# Patient Record
Sex: FEMALE | Race: WHITE | NOT HISPANIC OR LATINO | Employment: OTHER | URBAN - METROPOLITAN AREA
[De-identification: names, ages, dates, MRNs, and addresses within clinical notes are randomized per-mention and may not be internally consistent; named-entity substitution may affect disease eponyms.]

---

## 2017-09-18 ENCOUNTER — ALLSCRIPTS OFFICE VISIT (OUTPATIENT)
Dept: OTHER | Facility: OTHER | Age: 77
End: 2017-09-18

## 2017-10-26 NOTE — PROGRESS NOTES
Assessment  Assessed    1  Benign essential hypertension (401 1) (I10)   2  Chronic coronary artery disease (414 00) (I25 10)   3  Hypercholesterolemia (272 0) (E78 00)    Plan  Benign essential hypertension    · Atenolol 50 MG Oral Tablet (Tenormin); TAKE 1 TABLET DAILY   Rx By: Deena Arauz; Dispense: 90 Days ; #:90 Tablet; Refill: 3;For: Benign essential hypertension; NITHYA = N; Verified Transmission to [address]; Last Updated By: System, SureScripts; 9/18/2017 2:59:10 PM  Chronic coronary artery disease    · Follow-up visit in 1 year Evaluation and Treatment  Follow-up  Status: Hold For -  Scheduling  Requested for: 68Etz6085   Ordered; For: Chronic coronary artery disease; Ordered By: Deena Arauz Performed:  Due: 67Yii0554  Edema    · Furosemide 20 MG Oral Tablet (Lasix); TAKE 1 TABLET DAILY   Rx By: Deena Arauz; Dispense: 90 Days ; #:90 Tablet; Refill: 3;For: Edema; NITHYA = N; Verified Transmission to [facility]; Last Updated By: System, SureScripts; 9/18/2017 2:59:09 PM  Hypercholesterolemia    · EKG/ECG- POC; Status:Complete;   Done: 64HDC5031 02:55PM   Perform: In Office; Last Updated Jaime Crawford; 9/18/2017 2:55:45 PM;Ordered;For:Hypercholesterolemia; Ordered By:Gunner Broussard;    Discussion/Summary  Cardiology Discussion Summary Free Text Note Form [facility]:   1  CAD  Nonobstructive by cath in 2009, with 50% stenosis in proximal RCA that was not significant by FFR at that time  She is on Atenolol, aspirin, and Simvastatin  HR is slightly irregular, but on EKG is sinus with sinus arrhythmia  On  instead of 81 mg due to tamoxifen use  HTN  On Atenolol and Quinapril and Lasix as needed (takes once every few weeks)  BP borderline low, but she denies any symptoms  If she does become lightheaded or dizzy, consider decreasing Quinapril  HL  On Simvastatin 20 and Zetia  Lipid panel 9/15 showed total cholesterol 130, , HDL 39, LDL 71   Lipid panel 3/17 showed total cholesterol 142, HDL 42, LDL 77,   Chief Complaint  Chief Complaint Free Text Note Form: 10 month cardiac follow up  No cardiac complaints  History of Present Illness  Cardiology HPI Free Text Note Form [facility]: 65year old woman with a history of CAD, HTN, HL, who previously followed with Dr Barrie Rubio, now following with me in [facility] office  underwent cardiac catheterization in 1/09 for abnormal pharmacologic stress test showing possible distal anterior ischemia  At that time, cath showed a 50% proximal stenosis in RCA, with FFR being 0 85 and not considered significant  12/06 showed normal LV and RV size and function, top normal LV wall thickness with grade I diastolic dysfunction  Mild MAC with mild MR  Aortic sclerosis  Mild TR with top normal RVSP (37 mm Hg)  Redundant atrial septal motion without evidence of intra-atrial shunt   had an episode of syncope in 3/16, thought it was related to Tamoxifen and dehydration  She has not had further syncope  denies any chest pain, shortness of breath  No LE edema  She takes Lasix once every few weeks for ankle edema  No orthopnea, uses 1 pillow to sleep, no PND  No palpitations, no dizziness or lightheadedness  She has vertigo, occurs with position  She walks over a mile a day a day, no chest pain or shortness of breath with this type of exertion  She reports she has limited her ice cream intake and has lost some weight  Review of Systems  Cardiology Female ROS:     Cardiac: no chest pain,-- no rhythm problems,-- no fainting/blackouts,-- no heart murmur present,-- no signs of swelling-- and-- no palpitations present  Skin: No complaints of nonhealing sores or skin rash     Genitourinary: post menopausal, but-- no recurrent urinary tract infections,-- no frequent urination at night,-- no difficult urination,-- no blood in urine,-- no kidney stones,-- no loss of bladder control,-- no kidney problems,-- no birth control or hormone replacement therapy-- and-- not pregnant   Psychological: No complaints of feeling depressed, anxiety, panic attacks, or difficulty concentrating  General: lack of energy/fatigue, but-- no trouble sleeping,-- no appetite changes,-- no changes in weight,-- no fever,-- no night sweats-- and-- no frequent infections  Respiratory: No complaints of shortness of breath, cough with sputum, or wheezing  HEENT: serious eye problems, but-- no hearing problems,-- no nose problems,-- no throat problems-- and-- no snoring   Gastrointestinal: No complaints of liver problems, nausea, vomiting, heartburn, constipation, bloody stools, diarrhea, problems swallowing, adbominal pain, or rectal bleeding  Hematologic: No complaints of bleeding disorders, anemia, blood clots, or excessive brusing  Neurological: No complaints of numbness, tingling, dizziness, weakness, seizures, headaches, syncope or fainting, AM fatigue, daytime sleepiness, no witnessed apnea episodes  Musculoskeletal: arthritis, but-- no back pain-- and-- no swelling/pain-- knees  ROS Reviewed:   ROS reviewed  Active Problems  Problems    1  Benign essential hypertension (401 1) (I10)   2  Chronic coronary artery disease (414 00) (I25 10)   3  Edema (782 3) (R60 9)   4  Hypercholesterolemia (272 0) (E78 00)   5  Hypokalemia (276 8) (E87 6)   6  Syncope (780 2) (R55)   7  Syncope, unspecified syncope type (780 2) (R55)    Past Medical History  Problems    1  History of carotid artery stenosis (V12 59) (Z86 79)   2  History of Prolapsing Mitral Valve Leaflet Syndrome (424 0)  Active Problems And Past Medical History Reviewed: The active problems and past medical history were reviewed and updated today  Surgical History  Problems    1  History of Breast Surgery Mastectomy   2  History of Cataract Surgery  Surgical History Reviewed: The surgical history was reviewed and updated today  Family History  Family History Reviewed:    The family history was reviewed and updated today  Social History  Problems    · Former smoker (M83 55) (I79 321)  Social History Reviewed: The social history was reviewed and updated today  Current Meds   1  Aspirin 325 MG CAPS; Therapy: (Recorded:28Nov2016) to Recorded   2  Atenolol 50 MG Oral Tablet; Take 1 tablet daily; Therapy: 84QAL6656 to (Evaluate:96Ovi5377)  Requested for: 37Sdy5574; Last   Rx:85Vlo2542 Ordered   3  CVS L-Lysine 500 MG Oral Tablet; Take as directed Recorded   4  Desonide 0 05 % External Ointment; Therapy: (Recorded:10Mar2015) to Recorded   5  Ezetimibe 10 MG Oral Tablet; TAKE 1 TABLET DAILY ALONG WITH SIMVASTATIN   REPLACES VYTORIN**;   Therapy: 07OOQ7944 to (Evaluate:27Jan2018)  Requested for: 16NSY9195; Last   Rx:11Wmp6664 Ordered   6  Furosemide 20 MG Oral Tablet; TAKE 1 TABLET DAILY; Therapy: 87FDF3962 to (Evaluate:07Jun2015)  Requested for: 12Jun2014; Last   Rx:12Jun2014 Ordered   7  HM Vitamin D3 2000 UNIT Oral Capsule; TAKE 1 CAPSULE Daily Recorded   8  Macular Vitamin Benefit Oral Tablet; Take 1 tablet daily Recorded   9  Omega Essentials/Vit D3 CAPS; Take 1 capsule twice daily Recorded   10  Potassium Chloride ER 10 MEQ Oral Tablet Extended Release; TAKE 1 TABLET TWICE    DAILY WITH MEALS; Therapy: 07YZZ9862 to (Evaluate:09Oct2017)  Requested for: 59Rap1293; Last    Rx:11Afo4927 Ordered   11  Quinapril HCl - 10 MG Oral Tablet; take 1 tablet by mouth daily; Therapy: 36LSE5297 to (Gomez Reilly)  Requested for: 12Jun2017; Last    Rx:08Jun2017 Ordered   12  Simvastatin 20 MG Oral Tablet; Take 1 tablet by mouth  daily; Therapy: 51WDO6829 to (21 )  Requested for: 84DVH0995; Last    Rx:92Eis1471 Ordered   13  Vitamin B-12 1000 MCG Oral Tablet; Therapy: 56AST6603 to Recorded   14   Zetia 10 MG Oral Tablet; TAKE 1 TABLET DAILY ALONG WITH SIMVASTATIN REPLACES    VYTORIN**;    Therapy: 40PFR2763 to (Evaluate:01Rkf7639)  Requested for: 25Oct2016; Last    Rx:25Oct2016; Status: ACTIVE - Retrospective Authorization Ordered  Medication List Reviewed: The medication list was reviewed and updated today  Allergies  Medication    1  Sulfa Drugs   2  Adhesive Tape TAPE   3  Clarithromycin TABS   4  Doxycycline Monohydrate CAPS   5  Erythromycin Derivatives   6  Penicillins  Non-Medication    7  IVP Dye    Vitals  Vital Signs    Recorded: 57Ndk6375 02:53PM   Heart Rate 67, Apical   Pulse Quality Normal, Apical   Systolic 96, LUE, Sitting   Diastolic 68, LUE, Sitting   Height 5 ft 5 5 in   Weight 145 lb    BMI Calculated 23 76   BSA Calculated 1 74   O2 Saturation 98, RA     Physical Exam    Constitutional   General appearance: No acute distress, well appearing and well nourished  Eyes   Conjunctiva and Sclera examination: Conjunctiva pink, sclera anicteric  Ears, Nose, Mouth, and Throat - Oropharynx: Clear, nares are clear, mucous membranes are moist    Neck   Neck and thyroid: Normal, supple, trachea midline, no thyromegaly  Pulmonary   Respiratory effort: No increased work of breathing or signs of respiratory distress  Auscultation of lungs: Clear to auscultation, no rales, no rhonchi, no wheezing, good air movement  Cardiovascular   Palpation of heart: Normal PMI, no thrills  Auscultation of heart: Normal rate and rhythm, normal S1 and S2, no murmurs  Carotid pulses: Normal, 2+ bilaterally  Examination of extremities for edema and/or varicosities: Normal     Chest - Chest: Normal    Abdomen   Abdomen: Non-tender and no distention  Musculoskeletal Gait and station: Normal gait  Skin - Skin and subcutaneous tissue: Normal without rashes or lesions  Skin is warm and well perfused, normal turgor  Neurologic - Speech: Normal     Psychiatric - Orientation to person, place, and time: Normal -- Mood and affect: Normal       Results/Data  ECG Report:   Rhythm and rate:  normal sinus rhythm     Axis: the QRS axis is normal       Signatures   Electronically signed by Caroline Crain MD; Sep 18 2017  3:07PM EST                       (Author)

## 2018-01-12 VITALS
SYSTOLIC BLOOD PRESSURE: 96 MMHG | WEIGHT: 145 LBS | OXYGEN SATURATION: 98 % | HEART RATE: 67 BPM | BODY MASS INDEX: 23.3 KG/M2 | DIASTOLIC BLOOD PRESSURE: 68 MMHG | HEIGHT: 66 IN

## 2018-01-13 NOTE — MISCELLANEOUS
Message   Recorded as Task   Date: 04/01/2016 08:12 AM, Created By: Sergei Stanton   Task Name: Medical Complaint Callback   Assigned To: Sergei Stanton   Regarding Patient: Moshe Pantoja, Status: Active   Comment:    Sergei Stanton - 01 Apr 2016 8:12 AM     TASK CREATED  Caller: Self; Medical Complaint; [phone] (Home)  pt called to report that she fainted last Thursday 3/24/she questioned if due to gereric atenolol   told her probably not   said fainted without warning did hit her head   did not get checked out anywhere  States BP was good and pulse rate was good when she came to  She felt may be do to her tamoxifen seeing Oncology early next week please advise  Ankur Farah - 01 Apr 2016 8:41 AM     TASK REPLIED TO: Previously Assigned To Ankur Farah  Nothing at present  Report episode to oncology, but call back  Obviously, if any further syncope or near syncope  Prior to that visit  Agreed not due to medication   Dana Alvarez - 01 Apr 2016 9:35 AM     TASK EDITED  Pt called and understands to discuss with oncology too and if any new episodes prior to future appt here to call  pt has had no farther symptoms  Active Problems    1  Accelerated essential hypertension (401 0) (I10)   2  Chronic coronary artery disease (414 00) (I25 10)   3  Edema (782 3) (R60 9)   4  Hypercholesterolemia (272 0) (E78 0)   5  Hypokalemia (276 8) (E87 6)    Current Meds   1  Aspirin EC 81 MG Oral Tablet Delayed Release; Take 1 tablet daily Recorded   2  Atenolol 100 MG Oral Tablet (Tenormin); Take 1 tablet daily; Therapy: 61ZSV6489 to (Celeste Isaac)  Requested for: 97QAR4339; Last   Rx:10Mar2015 Ordered   3  CVS L-Lysine 500 MG Oral Tablet; Take as directed Recorded   4  Desonide 0 05 % External Ointment; Therapy: (Recorded:10Mar2015) to Recorded   5  Furosemide 20 MG Oral Tablet (Lasix); TAKE 1 TABLET DAILY; Therapy: 49ZHQ1550 to (Evaluate:07Jun2015)  Requested for: 12Jun2014;  Last Rx: 36GCU9939 Ordered   6  HM Vitamin D3 2000 UNIT Oral Capsule; TAKE 1 CAPSULE Daily Recorded   7  Lasix 20 MG Oral Tablet (Furosemide); Take 1 tablet once daily; Therapy: 89BGP8127 to (Evaluate:27Jun2016)  Requested for: 72LXO8106; Last   Rx:30Nov2015 Ordered   8  Macular Vitamin Benefit Oral Tablet; Take 1 tablet daily Recorded   9  Omega Essentials/Vit D3 CAPS; Take 1 capsule twice daily Recorded   10  Potassium Chloride ER 10 MEQ Oral Tablet Extended Release; TAKE 1 TABLET TWICE    DAILY WITH MEALS; Therapy: 07FBD9031 to (Margaret Mclain)  Requested for: 30PVB1691; Last    Rx:26Oct2015 Ordered   11  Quinapril HCl - 10 MG Oral Tablet; TAKE 1 TABLET BY MOUTH DAILY; Therapy: 25HWH5390 to (August Mis)  Requested for: 27Edl1053; Last    Rx:76Egt7148 Ordered   12  Quinapril HCl - 10 MG Oral Tablet; TAKE 1 TABLET DAILY; Therapy: 13UMV4026 to (Evaluate:12Jun2015)  Requested for: 61KWL3088; Last    Rx:64Ntb7424 Ordered   13  Simvastatin 20 MG Oral Tablet; TAKE 1 TABLET DAILY ALONG WITH ZETIA **    REPLACES VYTORIN**;    Therapy: 29IIO9221 to (Evaluate:12Oct2016)  Requested for: 38QQJ5870; Last    Rx:16Mar2016 Ordered   14  Tamoxifen Citrate 20 MG Oral Tablet; Therapy: 78FKV7372 to Recorded   15  Tenormin 100 MG Oral Tablet (Atenolol); TAKE 1TABLET DAILY AS DIRECTED; Therapy: 14BQX6348 to (Evaluate:90Dfs4494)  Requested for: 99Tzp3971; Last    Rx:96Kut2925 Ordered   16  Zetia 10 MG Oral Tablet; TAKE 1 TABLET DAILY ALONG WITH SIMVASTATIN    REPLACES VYTORIN**;    Therapy: 11TTM6795 to (Evaluate:12Oct2016)  Requested for: 19PMJ1264; Last    Rx:16Mar2016 Ordered    Allergies    1  Sulfa Drugs   2  Adhesive Tape TAPE   3  Clarithromycin TABS   4  Doxycycline Monohydrate CAPS   5  Erythromycin Derivatives   6  Penicillins    7   IVP Dye    Signatures   Electronically signed by : Jany Crocker, ; Apr 1 2016  9:36AM EST                       (Author)

## 2018-03-21 DIAGNOSIS — I10 ESSENTIAL HYPERTENSION: Primary | ICD-10-CM

## 2018-03-22 RX ORDER — QUINAPRIL 10 MG/1
TABLET ORAL
Qty: 30 TABLET | Refills: 5 | Status: SHIPPED | OUTPATIENT
Start: 2018-03-22 | End: 2018-10-03 | Stop reason: SDUPTHER

## 2018-04-11 DIAGNOSIS — E78.5 DYSLIPIDEMIA, GOAL LDL BELOW 70: Primary | ICD-10-CM

## 2018-04-11 RX ORDER — SIMVASTATIN 20 MG
TABLET ORAL
Qty: 30 TABLET | Refills: 11 | Status: SHIPPED | OUTPATIENT
Start: 2018-04-11 | End: 2018-08-31

## 2018-07-30 RX ORDER — DESONIDE 0.5 MG/G
CREAM TOPICAL
COMMUNITY
End: 2018-08-31

## 2018-07-30 RX ORDER — ATENOLOL 50 MG/1
1 TABLET ORAL DAILY
COMMUNITY
Start: 2012-01-11 | End: 2018-08-31 | Stop reason: SDUPTHER

## 2018-07-30 RX ORDER — POTASSIUM CHLORIDE 750 MG/1
1 CAPSULE, EXTENDED RELEASE ORAL 2 TIMES DAILY
COMMUNITY
Start: 2012-01-11 | End: 2018-08-31

## 2018-07-30 RX ORDER — EZETIMIBE 10 MG/1
TABLET ORAL
COMMUNITY
Start: 2017-07-31 | End: 2018-08-31

## 2018-07-30 RX ORDER — BUTALBITAL, ASPIRIN, AND CAFFEINE 50; 325; 40 MG/1; MG/1; MG/1
CAPSULE ORAL
COMMUNITY
End: 2018-08-31

## 2018-07-30 RX ORDER — LANOLIN ALCOHOL/MO/W.PET/CERES
CREAM (GRAM) TOPICAL
COMMUNITY
Start: 2016-05-12 | End: 2018-08-31

## 2018-07-30 RX ORDER — FUROSEMIDE 20 MG/1
1 TABLET ORAL DAILY
COMMUNITY
Start: 2014-06-12 | End: 2018-08-31

## 2018-07-30 RX ORDER — LANOLIN ALCOHOL/MO/W.PET/CERES
CREAM (GRAM) TOPICAL
COMMUNITY

## 2018-07-30 RX ORDER — SIMVASTATIN 20 MG
TABLET ORAL
COMMUNITY
Start: 2016-03-16 | End: 2018-08-31

## 2018-08-31 ENCOUNTER — OFFICE VISIT (OUTPATIENT)
Dept: CARDIOLOGY CLINIC | Facility: CLINIC | Age: 78
End: 2018-08-31
Payer: MEDICARE

## 2018-08-31 VITALS
HEIGHT: 66 IN | HEART RATE: 71 BPM | WEIGHT: 147 LBS | SYSTOLIC BLOOD PRESSURE: 124 MMHG | DIASTOLIC BLOOD PRESSURE: 86 MMHG | BODY MASS INDEX: 23.63 KG/M2 | OXYGEN SATURATION: 98 %

## 2018-08-31 DIAGNOSIS — I25.10 CORONARY ARTERY DISEASE INVOLVING NATIVE CORONARY ARTERY OF NATIVE HEART WITHOUT ANGINA PECTORIS: ICD-10-CM

## 2018-08-31 DIAGNOSIS — I10 ESSENTIAL HYPERTENSION: Primary | ICD-10-CM

## 2018-08-31 DIAGNOSIS — E78.2 MIXED HYPERLIPIDEMIA: ICD-10-CM

## 2018-08-31 PROCEDURE — 99214 OFFICE O/P EST MOD 30 MIN: CPT | Performed by: INTERNAL MEDICINE

## 2018-08-31 RX ORDER — ATORVASTATIN CALCIUM 40 MG/1
40 TABLET, FILM COATED ORAL DAILY
Qty: 30 TABLET | Refills: 5 | Status: SHIPPED | OUTPATIENT
Start: 2018-08-31 | End: 2019-02-28 | Stop reason: SDUPTHER

## 2018-08-31 RX ORDER — ASPIRIN 325 MG
325 TABLET ORAL DAILY
COMMUNITY

## 2018-08-31 RX ORDER — ATENOLOL 50 MG/1
50 TABLET ORAL DAILY
Qty: 30 TABLET | Refills: 11 | Status: SHIPPED | OUTPATIENT
Start: 2018-08-31 | End: 2019-09-27 | Stop reason: SDUPTHER

## 2018-08-31 RX ORDER — TAMOXIFEN CITRATE 20 MG/1
20 TABLET ORAL 2 TIMES DAILY
COMMUNITY

## 2018-08-31 NOTE — PROGRESS NOTES
CARDIOLOGY OFFICE VISIT  [facility]  [address], [place], [address] [place] [address], [place], [facility] Sunil Abarca  Tel: [phone]      NAME: Lilibeth Mahoney  AGE: 77 y o  SEX: female  : 1940   MRN: [id number]      Chief Complaint:  Chief Complaint   Patient presents with    Follow-up     HTN, CAD  History of Present Illness:   Patient of Dr Gustavo Barton who wants to change over to our office  She comes for a follow up  States she is doing well from cardiac stand point and denies chest pain / pressure, SOB, palpitations, lightheadedness, syncope, swelling feet, orthopnea, PND, claudication  She walks over a mile a day a day, no chest pain or shortness of breath felt  CAD -   Cardiac catheterization in 2009 showed 50% stenosis in proximal RCA that was not significant by FFR at that time  Currently on aspirin 325 mg (instead of 81 mg due to tamoxifen use), atenolol, simvastatin  She was on Zetia but she has not been able to afford it recently  States is doing well cardiac wise with no cardiac symptoms  HTN -  Has been hypertensive for many years  Taking medications (Atenolol, Quinapril) regularly  Denies lightheadedness, headache, medication side effects  HLP -  Has had hyperlipidemia for many years  Taking simvastatin 20 mg but not taking Zetia recently due to cost  Denies myalgia  Due for lipid panel via PCP    She had an episode of syncope in 3/16, thought it was related to Tamoxifen and dehydration  She has not had further syncope  Echo  showed normal LV and RV size and function, top normal LV wall thickness with grade I diastolic dysfunction  Mild MAC with mild MR  Aortic sclerosis  Mild TR with top normal RVSP (37 mm Hg)  Redundant atrial septal motion without evidence of intra-atrial shunt        Past Medical History:  Past Medical History:   Diagnosis Date    Coronary artery disease     Coronary artery stenosis     Edema     Hypercholesterolemia     Hypertension     Hypokalemia     Mitral valve prolapse     Syncope          Past Surgical History:  Past Surgical History:   Procedure Laterality Date    CATARACT EXTRACTION      MASTECTOMY           Family History:   noncontributory      Social History:  Social History     Social History    Marital status: /Civil Union     Spouse name: N/A    Number of children: N/A    Years of education: N/A     Social History Main Topics    Smoking status: Former Smoker    Smokeless tobacco: Never Used    Alcohol use Not on file    Drug use: Unknown    Sexual activity: Not on file     Other Topics Concern    Not on file     Social History Narrative    No narrative on file         Active Problems:  Patient Active Problem List   Diagnosis    Coronary artery disease involving native coronary artery of native heart without angina pectoris    Essential hypertension    Mixed hyperlipidemia         The following portions of the patient's history were reviewed and updated as appropriate: past medical history, past surgical history, past family history,  past social history, current medications, allergies and problem list       Review of Systems:  Constitutional: Denies fever, chills, fatigue  Eyes: Denies eye redness, eye discharge, double vision  ENT: Denies hearing loss, tinnitus, sneezing, nasal discharge, sore throat   Respiratory: Denies cough, expectoration, hemoptysis, shortness of breath  Cardiovascular: Denies chest pain, palpitations, orthopnea, PND, lower extremity swelling  Gastrointestinal: Denies abdominal pain, nausea, vomiting, hematemesis, diarrhea, bloody stools  Genito-Urinary: Denies dysuria, incontinence  Musculoskeletal: Denies back pain, joint pain, muscle pain  Neurologic: Denies confusion, lightheadedness, syncope, headache, focal weakness, sensory changes, seizures  Endocrine: Denies polyuria, polydipsia, temperature intolerance  Allergy and Immunology: Denies hives, insect bite sensitivity  Hematological and Lymphatic: Denies bleeding problems, swollen glands   Psychological: Denies depression, suicidal ideation, anxiety, panic  Dermatological: Denies pruritus, rash, skin lesion changes      Vitals:  Vitals:    08/31/18 1357   BP: 124/86   Pulse: 71   SpO2: 98%       Body mass index is 24 09 kg/m²  Weight (last 2 days)     Date/Time   Weight    08/31/18 1357  66 7 (147)                Physical Examination:  General: Patient is not in acute distress  Awake, alert, oriented in time, place and person  Responding to commands  Head: Normocephalic  Atraumatic  Eyes: Both pupils normal sized, round and reactive to light  Nonicteric  ENT: Normal external ear canals  Nares normal, no drainage  Lips and oral mucosa normal  Neck: Supple  JVP not raised  Trachea central  No thyromegaly  Lungs: Bilateral bronchovascular breath sounds with no crackles or rhonchi  Chest wall: No tenderness  Cardiovascular: RRR  S1 and S2 normal  No murmur, rub or gallop  Gastrointestinal: Abdomen soft, nontender  No guarding or rigidity  Liver and spleen not palpable  Bowel sounds present  Neurologic: Patient is awake, alert, oriented in time, place and person  Responding to command  Moving all extremities  Integumentary:  No skin rash  Lymphatic: No cervical lymphadenopathy  Back: Symmetric   No CVA tenderness  Extremities: No clubbing, cyanosis or edema      Medications:    Current Outpatient Prescriptions:     aspirin 325 mg tablet, Take 325 mg by mouth daily, Disp: , Rfl:     atenolol (TENORMIN) 50 mg tablet, Take 1 tablet (50 mg total) by mouth daily, Disp: 30 tablet, Rfl: 11    Cholecalciferol (HM VITAMIN D3) 2000 units CAPS, Take 1 capsule by mouth daily, Disp: , Rfl:     Lysine HCl (CVS L-LYSINE) 500 MG TABS, Take by mouth, Disp: , Rfl:     quinapril (ACCUPRIL) 10 mg tablet, TAKE 1 TABLET BY MOUTH DAILY, Disp: 30 tablet, Rfl: 5    tamoxifen (NOLVADEX) 20 mg tablet, Take 20 mg by mouth 2 (two) times a day, Disp: , Rfl:     atorvastatin (LIPITOR) 40 mg tablet, Take 1 tablet (40 mg total) by mouth daily, Disp: 30 tablet, Rfl: 5      Allergies: Allergies   Allergen Reactions    Clarithromycin      Reaction Date: [id number];     Doxycycline      Reaction Date: [id number];     Erythromycin     Iodinated Diagnostic Agents Other (See Comments)    Medical Tape     Penicillins          Assessment and Plan:  1  Coronary artery disease involving native coronary artery of native heart without angina pectoris   stable  Continue aspirin, beta-blocker, statin  2  Essential hypertension    BP stable  Continue beta-blocker and ACE-inhibitor  Patient has discontinued furosemide and potassium after an episode of hypokalemia    3  Mixed hyperlipidemia   changed simvastatin 20 mg to atorvastatin 40 mg  Might not need Zetia  Await blood work  Recommend aggressive risk factor modification and therapeutic lifestyle changes  Low-salt, low-calorie, low-fat, low-cholesterol diet with regular exercise and to optimize weight  I will defer the ordering and monitoring of necessity lab studies to you, but I am available and happy to review and manage any of the data at your request in the future  Discussed concepts of atherosclerosis, including signs and symptoms of cardiac disease  Previous studies were reviewed  Safety measures were reviewed  Questions were entertained and answered  Patient was advised to report any problems requiring medical attention  Follow-up with PCP and appropriate specialist and lab work as discussed  Return for follow up visit as scheduled or earlier, if needed  Thank you for allowing me to participate in the care and evaluation of your patient  Should you have any questions, please feel free to contact me        Martha Mcdonald MD  8/31/2018,2:22 PM

## 2018-10-03 DIAGNOSIS — I10 ESSENTIAL HYPERTENSION: ICD-10-CM

## 2018-10-03 RX ORDER — QUINAPRIL 10 MG/1
TABLET ORAL
Qty: 30 TABLET | Refills: 11 | Status: SHIPPED | OUTPATIENT
Start: 2018-10-03 | End: 2019-03-18 | Stop reason: SDUPTHER

## 2019-02-28 DIAGNOSIS — E78.2 MIXED HYPERLIPIDEMIA: ICD-10-CM

## 2019-02-28 RX ORDER — ATORVASTATIN CALCIUM 40 MG/1
40 TABLET, FILM COATED ORAL DAILY
Qty: 30 TABLET | Refills: 0 | Status: SHIPPED | OUTPATIENT
Start: 2019-02-28 | End: 2019-04-06 | Stop reason: SDUPTHER

## 2019-03-18 ENCOUNTER — OFFICE VISIT (OUTPATIENT)
Dept: CARDIOLOGY CLINIC | Facility: CLINIC | Age: 79
End: 2019-03-18
Payer: MEDICARE

## 2019-03-18 VITALS
SYSTOLIC BLOOD PRESSURE: 114 MMHG | DIASTOLIC BLOOD PRESSURE: 78 MMHG | OXYGEN SATURATION: 97 % | HEART RATE: 66 BPM | WEIGHT: 139 LBS | HEIGHT: 66 IN | BODY MASS INDEX: 22.34 KG/M2

## 2019-03-18 DIAGNOSIS — I10 ESSENTIAL HYPERTENSION: ICD-10-CM

## 2019-03-18 DIAGNOSIS — E78.2 MIXED HYPERLIPIDEMIA: ICD-10-CM

## 2019-03-18 DIAGNOSIS — I25.10 CORONARY ARTERY DISEASE INVOLVING NATIVE CORONARY ARTERY OF NATIVE HEART WITHOUT ANGINA PECTORIS: Primary | ICD-10-CM

## 2019-03-18 PROCEDURE — 99214 OFFICE O/P EST MOD 30 MIN: CPT | Performed by: INTERNAL MEDICINE

## 2019-03-18 RX ORDER — QUINAPRIL 5 1/1
5 TABLET ORAL DAILY
Qty: 90 TABLET | Refills: 2 | Status: SHIPPED | OUTPATIENT
Start: 2019-03-18 | End: 2019-05-16 | Stop reason: SDUPTHER

## 2019-03-18 NOTE — PROGRESS NOTES
CARDIOLOGY OFFICE VISIT  [facility]  [address], [address] [place] [address], [place], [facility] Sunil Abarca  Tel: [phone]      NAME: Elizabeth Mahoney  AGE: 77 y o  SEX: female  : 1940   MRN: [id number]      Chief Complaint:  Chief Complaint   Patient presents with    Follow-up     7 month - HTN/CAD         History of Present Illness:   Patient comes for a follow up  States she is doing well from cardiac stand point and denies chest pain / pressure, SOB, palpitations, lightheadedness, syncope, swelling feet, orthopnea, PND, claudication  She walks over a mile a day a day, no chest pain or shortness of breath felt  CAD -   Cardiac catheterization in 2009 showed 50% stenosis in proximal RCA that was not significant by FFR at that time  Currently on aspirin 325 mg (instead of 81 mg due to tamoxifen use), atenolol, simvastatin  She was on Zetia but she has not been able to afford it recently  States is doing well cardiac wise with no cardiac symptoms  HTN -  Has been hypertensive for many years  Taking medications (Atenolol, Quinapril) regularly  Denies lightheadedness, headache, medication side effects  HLP -  Has had hyperlipidemia for many years  Taking simvastatin 20 mg but not taking Zetia recently due to cost  Denies myalgia  Due for lipid panel via PCP    She had an episode of syncope in 3/16, thought it was related to Tamoxifen and dehydration  She has not had further syncope  Echo  showed normal LV and RV size and function, top normal LV wall thickness with grade I diastolic dysfunction  Mild MAC with mild MR  Aortic sclerosis  Mild TR with top normal RVSP (37 mm Hg)  Redundant atrial septal motion without evidence of intra-atrial shunt        Past Medical History:  Past Medical History:   Diagnosis Date    Coronary artery disease     Coronary artery stenosis     Edema     Hypercholesterolemia     Hypertension     Hypokalemia     Mitral valve prolapse     Syncope          Past Surgical History:  Past Surgical History:   Procedure Laterality Date    CATARACT EXTRACTION      MASTECTOMY           Family History:   noncontributory      Social History:  Social History     Socioeconomic History    Marital status: /Civil Union     Spouse name: Not on file    Number of children: Not on file    Years of education: Not on file    Highest education level: Not on file   Occupational History    Not on file   Social Needs    Financial resource strain: Not on file    Food insecurity:     Worry: Not on file     Inability: Not on file    Transportation needs:     Medical: Not on file     Non-medical: Not on file   Tobacco Use    Smoking status: Former Smoker    Smokeless tobacco: Never Used   Substance and Sexual Activity    Alcohol use: Not on file    Drug use: Not on file    Sexual activity: Not on file   Lifestyle    Physical activity:     Days per week: Not on file     Minutes per session: Not on file    Stress: Not on file   Relationships    Social connections:     Talks on phone: Not on file     Gets together: Not on file     Attends [employer] service: Not on file     Active member of club or organization: Not on file     Attends meetings of clubs or organizations: Not on file     Relationship status: Not on file    Intimate partner violence:     Fear of current or ex partner: Not on file     Emotionally abused: Not on file     Physically abused: Not on file     Forced sexual activity: Not on file   Other Topics Concern    Not on file   Social History Narrative    Not on file         Active Problems:  Patient Active Problem List   Diagnosis    Coronary artery disease involving native coronary artery of native heart without angina pectoris    Essential hypertension    Mixed hyperlipidemia         The following portions of the patient's history were reviewed and updated as appropriate: past medical history, past surgical history, past family history,  past social history, current medications, allergies and problem list       Review of Systems:  Constitutional: Denies fever, chills, fatigue  Eyes: Denies eye redness, eye discharge, double vision  ENT: Denies hearing loss, tinnitus, sneezing, nasal discharge, sore throat   Respiratory: Denies cough, expectoration, hemoptysis, shortness of breath  Cardiovascular: Denies chest pain, palpitations, orthopnea, PND, lower extremity swelling  Gastrointestinal: Denies abdominal pain, nausea, vomiting, hematemesis, diarrhea, bloody stools  Genito-Urinary: Denies dysuria, incontinence  Musculoskeletal: Denies back pain, joint pain, muscle pain  Neurologic: Denies confusion, lightheadedness, syncope, headache, focal weakness, sensory changes, seizures  Endocrine: Denies polyuria, polydipsia, temperature intolerance  Allergy and Immunology: Denies hives, insect bite sensitivity  Hematological and Lymphatic: Denies bleeding problems, swollen glands   Psychological: Denies depression, suicidal ideation, anxiety, panic  Dermatological: Denies pruritus, rash, skin lesion changes      Vitals:  Vitals:    03/18/19 1332   BP: 114/78   Pulse: 66   SpO2: 97%       Body mass index is 22 78 kg/m²  Weight (last 2 days)     Date/Time   Weight    03/18/19 1332   63 (139)                Physical Examination:  General: Patient is not in acute distress  Awake, alert, oriented in time, place and person  Responding to commands  Head: Normocephalic  Atraumatic  Eyes: Both pupils normal sized, round and reactive to light  Nonicteric  ENT: Normal external ear canals  Nares normal, no drainage  Lips and oral mucosa normal  Neck: Supple  JVP not raised  Trachea central  No thyromegaly  Lungs: Bilateral bronchovascular breath sounds with no crackles or rhonchi  Chest wall: No tenderness  Cardiovascular: RRR  S1 and S2 normal  No murmur, rub or gallop  Gastrointestinal: Abdomen soft, nontender  No guarding or rigidity  Liver and spleen not palpable  Bowel sounds present  Neurologic: Patient is awake, alert, oriented in time, place and person  Responding to command  Moving all extremities  Integumentary:  No skin rash  Lymphatic: No cervical lymphadenopathy  Back: Symmetric  No CVA tenderness  Extremities: No clubbing, cyanosis or edema      Medications:    Current Outpatient Medications:     aspirin 325 mg tablet, Take 325 mg by mouth daily, Disp: , Rfl:     atenolol (TENORMIN) 50 mg tablet, Take 1 tablet (50 mg total) by mouth daily, Disp: 30 tablet, Rfl: 11    atorvastatin (LIPITOR) 40 mg tablet, TAKE 1 TABLET (40 MG TOTAL) BY MOUTH DAILY, Disp: 30 tablet, Rfl: 0    Cholecalciferol (HM VITAMIN D3) 2000 units CAPS, Take 1 capsule by mouth daily, Disp: , Rfl:     Lysine HCl (CVS L-LYSINE) 500 MG TABS, Take by mouth, Disp: , Rfl:     quinapril (ACCUPRIL) 10 mg tablet, TAKE 1 TABLET BY MOUTH DAILY, Disp: 30 tablet, Rfl: 11    tamoxifen (NOLVADEX) 20 mg tablet, Take 20 mg by mouth 2 (two) times a day, Disp: , Rfl:       Allergies: Allergies   Allergen Reactions    Clarithromycin      Reaction Date: [id number];     Doxycycline      Reaction Date: [id number];     Erythromycin     Iodinated Diagnostic Agents Other (See Comments)    Medical Tape     Penicillins          Assessment and Plan:  1  Coronary artery disease involving native coronary artery of native heart without angina pectoris   stable  Continue aspirin, beta-blocker, statin  2  Essential hypertension  BP soft  Decrease quinapril to 5 mg daily  Continue atenolol as before    3  Mixed hyperlipidemia   changed simvastatin 20 mg to atorvastatin 40 mg  Might not need Zetia  Await blood work  Recommend aggressive risk factor modification and therapeutic lifestyle changes  Low-salt, low-calorie, low-fat, low-cholesterol diet with regular exercise and to optimize weight    I will defer the ordering and monitoring of necessity lab studies to you, but I am available and happy to review and manage any of the data at your request in the future  Discussed concepts of atherosclerosis, including signs and symptoms of cardiac disease  Previous studies were reviewed  Safety measures were reviewed  Questions were entertained and answered  Patient was advised to report any problems requiring medical attention  Follow-up with PCP and appropriate specialist and lab work as discussed  Return for follow up visit as scheduled or earlier, if needed  Thank you for allowing me to participate in the care and evaluation of your patient  Should you have any questions, please feel free to contact me        Minal Cuello MD  3/18/2019,1:56 PM

## 2019-04-06 DIAGNOSIS — E78.2 MIXED HYPERLIPIDEMIA: ICD-10-CM

## 2019-04-08 RX ORDER — ATORVASTATIN CALCIUM 40 MG/1
40 TABLET, FILM COATED ORAL DAILY
Qty: 30 TABLET | Refills: 0 | Status: SHIPPED | OUTPATIENT
Start: 2019-04-08 | End: 2019-04-20 | Stop reason: SDUPTHER

## 2019-04-20 DIAGNOSIS — E78.2 MIXED HYPERLIPIDEMIA: ICD-10-CM

## 2019-04-22 RX ORDER — ATORVASTATIN CALCIUM 40 MG/1
40 TABLET, FILM COATED ORAL DAILY
Qty: 30 TABLET | Refills: 0 | Status: SHIPPED | OUTPATIENT
Start: 2019-04-22 | End: 2019-06-08 | Stop reason: SDUPTHER

## 2019-05-16 DIAGNOSIS — I10 ESSENTIAL HYPERTENSION: ICD-10-CM

## 2019-05-16 RX ORDER — QUINAPRIL 5 1/1
5 TABLET ORAL DAILY
Qty: 90 TABLET | Refills: 3 | Status: SHIPPED | OUTPATIENT
Start: 2019-05-16 | End: 2019-10-17 | Stop reason: SDUPTHER

## 2019-06-08 DIAGNOSIS — E78.2 MIXED HYPERLIPIDEMIA: ICD-10-CM

## 2019-06-10 RX ORDER — ATORVASTATIN CALCIUM 40 MG/1
40 TABLET, FILM COATED ORAL DAILY
Qty: 30 TABLET | Refills: 0 | Status: SHIPPED | OUTPATIENT
Start: 2019-06-10 | End: 2019-09-06 | Stop reason: SDUPTHER

## 2019-07-10 ENCOUNTER — TELEPHONE (OUTPATIENT)
Dept: PULMONOLOGY | Facility: CLINIC | Age: 79
End: 2019-07-10

## 2019-09-06 DIAGNOSIS — E78.2 MIXED HYPERLIPIDEMIA: ICD-10-CM

## 2019-09-06 RX ORDER — ATORVASTATIN CALCIUM 40 MG/1
40 TABLET, FILM COATED ORAL DAILY
Qty: 30 TABLET | Refills: 0 | Status: SHIPPED | OUTPATIENT
Start: 2019-09-06 | End: 2019-10-08 | Stop reason: SDUPTHER

## 2019-09-27 DIAGNOSIS — I10 ESSENTIAL HYPERTENSION: ICD-10-CM

## 2019-09-27 RX ORDER — ATENOLOL 50 MG/1
50 TABLET ORAL DAILY
Qty: 30 TABLET | Refills: 0 | Status: SHIPPED | OUTPATIENT
Start: 2019-09-27 | End: 2019-10-28 | Stop reason: SDUPTHER

## 2019-10-08 DIAGNOSIS — E78.2 MIXED HYPERLIPIDEMIA: ICD-10-CM

## 2019-10-08 RX ORDER — ATORVASTATIN CALCIUM 40 MG/1
40 TABLET, FILM COATED ORAL DAILY
Qty: 30 TABLET | Refills: 0 | Status: SHIPPED | OUTPATIENT
Start: 2019-10-08 | End: 2019-10-09 | Stop reason: SDUPTHER

## 2019-10-09 ENCOUNTER — OFFICE VISIT (OUTPATIENT)
Dept: CARDIOLOGY CLINIC | Facility: CLINIC | Age: 79
End: 2019-10-09
Payer: MEDICARE

## 2019-10-09 VITALS
HEIGHT: 66 IN | OXYGEN SATURATION: 97 % | WEIGHT: 140 LBS | BODY MASS INDEX: 22.5 KG/M2 | DIASTOLIC BLOOD PRESSURE: 70 MMHG | HEART RATE: 70 BPM | SYSTOLIC BLOOD PRESSURE: 110 MMHG

## 2019-10-09 DIAGNOSIS — I25.10 CORONARY ARTERY DISEASE INVOLVING NATIVE CORONARY ARTERY OF NATIVE HEART WITHOUT ANGINA PECTORIS: Primary | ICD-10-CM

## 2019-10-09 DIAGNOSIS — E78.2 MIXED HYPERLIPIDEMIA: ICD-10-CM

## 2019-10-09 DIAGNOSIS — I10 ESSENTIAL HYPERTENSION: ICD-10-CM

## 2019-10-09 PROCEDURE — 99214 OFFICE O/P EST MOD 30 MIN: CPT | Performed by: INTERNAL MEDICINE

## 2019-10-09 RX ORDER — ATORVASTATIN CALCIUM 40 MG/1
40 TABLET, FILM COATED ORAL DAILY
Qty: 30 TABLET | Refills: 11 | Status: SHIPPED | OUTPATIENT
Start: 2019-10-09 | End: 2019-11-08 | Stop reason: SDUPTHER

## 2019-10-09 RX ORDER — ATORVASTATIN CALCIUM 40 MG/1
40 TABLET, FILM COATED ORAL DAILY
Qty: 30 TABLET | Refills: 0 | Status: SHIPPED | OUTPATIENT
Start: 2019-10-09 | End: 2019-11-08 | Stop reason: SDUPTHER

## 2019-10-09 NOTE — PROGRESS NOTES
CARDIOLOGY OFFICE VISIT  [facility]  [address], [place], [facility] Sunil Abarca  Tel: [phone]      NAME: Shaunna Mahoney  AGE: [de-identified] y o  SEX: female  : 1940   MRN: [id number]      Chief Complaint:  Chief Complaint   Patient presents with    Follow-up         History of Present Illness:   Patient comes for follow up  States she is doing well from cardiac stand point and denies chest pain / pressure, SOB, palpitations, lightheadedness, syncope, swelling feet, orthopnea, PND, claudication  She walks over a mile a day a day, no chest pain or shortness of breath felt  CAD -  Cardiac catheterization in 2009 showed 50% stenosis in proximal RCA that was not significant by FFR at that time  Currently on aspirin 325 mg (instead of 81 mg due to tamoxifen use), atenolol, atorvastatin  States is doing well cardiac wise with no cardiac symptoms  HTN -  Has been hypertensive for many years  Taking medications regularly  Denies lightheadedness, headache, medication side effects  HLP -  Has had hyperlipidemia for many years  Taking statin regularly along with diet control  Denies myalgia  PCP closely monitoring the blood work  Past Medical History:  Past Medical History:   Diagnosis Date    Coronary artery disease     Coronary artery stenosis     Edema     Hypercholesterolemia     Hypertension     Hypokalemia     Mitral valve prolapse     Syncope          Past Surgical History:  Past Surgical History:   Procedure Laterality Date    CATARACT EXTRACTION      MASTECTOMY           Family History:  History reviewed  No pertinent family history        Social History:  Social History     Socioeconomic History    Marital status: /Civil Union     Spouse name: None    Number of children: None    Years of education: None    Highest education level: None   Occupational History    None   Social Needs    Financial resource strain: None    Food insecurity:     Worry: None     Inability: None    Transportation needs:     Medical: None     Non-medical: None   Tobacco Use    Smoking status: Former Smoker    Smokeless tobacco: Never Used   Substance and Sexual Activity    Alcohol use: None    Drug use: None    Sexual activity: None   Lifestyle    Physical activity:     Days per week: None     Minutes per session: None    Stress: None   Relationships    Social connections:     Talks on phone: None     Gets together: None     Attends [employer] service: None     Active member of club or organization: None     Attends meetings of clubs or organizations: None     Relationship status: None    Intimate partner violence:     Fear of current or ex partner: None     Emotionally abused: None     Physically abused: None     Forced sexual activity: None   Other Topics Concern    None   Social History Narrative    None         Active Problems:  Patient Active Problem List   Diagnosis    Coronary artery disease involving native coronary artery of native heart without angina pectoris    Essential hypertension    Mixed hyperlipidemia         The following portions of the patient's history were reviewed and updated as appropriate: past medical history, past surgical history, past family history,  past social history, current medications, allergies and problem list       Review of Systems:  Constitutional: Denies fever, chills, fatigue  Eyes: Denies eye discharge, double vision  ENT: Denies hearing loss, tinnitus, sore throat   Respiratory: Denies cough, expectoration, hemoptysis  Cardiovascular: Denies chest pain, palpitations, orthopnea, PND, lower extremity swelling  Gastrointestinal: Denies abdominal pain, nausea, vomiting, hematemesis, diarrhea, bloody stools  Genito-Urinary: Denies dysuria, incontinence  Musculoskeletal: Denies joint pain, muscle pain  Neurologic: Denies confusion, lightheadedness, syncope, headache, focal weakness, sensory changes, seizures  Endocrine: Denies polyuria, polydipsia, temperature intolerance  Allergy and Immunology: Denies hives, insect bite sensitivity  Hematological and Lymphatic: Denies bleeding problems, swollen glands   Psychological: Denies depression, suicidal ideation, anxiety, panic  Dermatological: Denies pruritus, rash, skin lesion changes      Vitals:  Vitals:    10/09/19 1329   BP: 110/70   Pulse: 70   SpO2: 97%       Body mass index is 22 94 kg/m²  Weight (last 2 days)     Date/Time   Weight    10/09/19 1329   63 5 (140)                Physical Examination:  General: Patient is not in acute distress  Awake, alert, oriented in time, place and person  Responding to commands  Head: Normocephalic  Atraumatic  Eyes: Both pupils normal sized, round and reactive to light  Nonicteric  ENT: Normal external ear canals  Nares normal, no drainage  Lips and oral mucosa normal  Neck: Supple  JVP not raised  Trachea central  No thyromegaly  Lungs: Bilateral bronchovascular breath sounds with no crackles or rhonchi  Chest wall: No tenderness  Cardiovascular: RRR  S1 and S2 normal  No murmur, rub or gallop  Gastrointestinal: Abdomen soft, nontender  No guarding or rigidity  Liver and spleen not palpable  Bowel sounds present  Neurologic: Patient is awake, alert, oriented in time, place and person  Responding to command  Moving all extremities  Integumentary:  No skin rash  Lymphatic: No cervical lymphadenopathy  Back: Symmetric   No CVA tenderness  Extremities: No clubbing, cyanosis or edema    Medications:    Current Outpatient Medications:     aspirin 325 mg tablet, Take 325 mg by mouth daily, Disp: , Rfl:     atenolol (TENORMIN) 50 mg tablet, TAKE 1 TABLET (50 MG TOTAL) BY MOUTH DAILY, Disp: 30 tablet, Rfl: 0    atorvastatin (LIPITOR) 40 mg tablet, Take 1 tablet (40 mg total) by mouth daily, Disp: 30 tablet, Rfl: 11    Cholecalciferol (HM VITAMIN D3) 2000 units CAPS, Take 1 capsule by mouth daily, Disp: , Rfl:     Lysine HCl (CVS L-LYSINE) 500 MG TABS, Take by mouth, Disp: , Rfl:     quinapril (ACCUPRIL) 5 mg tablet, Take 1 tablet (5 mg total) by mouth daily, Disp: 90 tablet, Rfl: 3    tamoxifen (NOLVADEX) 20 mg tablet, Take 20 mg by mouth 2 (two) times a day, Disp: , Rfl:       Allergies: Allergies   Allergen Reactions    Clarithromycin      Reaction Date: [id number];     Doxycycline      Reaction Date: [id number];     Erythromycin     Iodinated Diagnostic Agents Other (See Comments)    Medical Tape     Penicillins          Assessment and Plan:  1  Coronary artery disease involving native coronary artery of native heart without angina pectoris    Stable  Continue aspirin, beta-blocker, statin  2  Mixed hyperlipidemia    Continue statin and diet control  Her PCP closely monitor the blood work  - atorvastatin (LIPITOR) 40 mg tablet; Take 1 tablet (40 mg total) by mouth daily  Dispense: 30 tablet; Refill: 11    3  Essential hypertension   BP stable  Continue current medications    Recommend aggressive risk factor modification and therapeutic lifestyle changes  Low-salt, low-calorie, low-fat, low-cholesterol diet with regular exercise and to optimize weight  I will defer the ordering and monitoring of necessity lab studies to you, but I am available and happy to review and manage any of the data at your request in the future  Discussed concepts of atherosclerosis, including signs and symptoms of cardiac disease  Previous studies were reviewed  Safety measures were reviewed  Questions were entertained and answered  Patient was advised to report any problems requiring medical attention  Follow-up with PCP and appropriate specialist and lab work as discussed  Return for follow up visit as scheduled or earlier, if needed  Thank you for allowing me to participate in the care and evaluation of your patient  Should you have any questions, please feel free to contact me        Desirae Bajwa Johnathan Chapman MD  10/9/2019,1:55 PM

## 2019-10-15 DIAGNOSIS — I10 ESSENTIAL HYPERTENSION: ICD-10-CM

## 2019-10-16 RX ORDER — QUINAPRIL 10 MG/1
TABLET ORAL
Qty: 30 TABLET | Refills: 0 | OUTPATIENT
Start: 2019-10-16

## 2019-10-17 DIAGNOSIS — I10 ESSENTIAL HYPERTENSION: ICD-10-CM

## 2019-10-17 RX ORDER — QUINAPRIL 5 1/1
5 TABLET ORAL DAILY
Qty: 90 TABLET | Refills: 0 | Status: SHIPPED | OUTPATIENT
Start: 2019-10-17 | End: 2019-12-05 | Stop reason: SDUPTHER

## 2019-10-28 DIAGNOSIS — I10 ESSENTIAL HYPERTENSION: ICD-10-CM

## 2019-10-28 RX ORDER — ATENOLOL 50 MG/1
50 TABLET ORAL DAILY
Qty: 30 TABLET | Refills: 11 | Status: SHIPPED | OUTPATIENT
Start: 2019-10-28 | End: 2019-11-21 | Stop reason: SDUPTHER

## 2019-11-08 DIAGNOSIS — E78.2 MIXED HYPERLIPIDEMIA: ICD-10-CM

## 2019-11-08 RX ORDER — ATORVASTATIN CALCIUM 40 MG/1
40 TABLET, FILM COATED ORAL DAILY
Qty: 90 TABLET | Refills: 3 | Status: SHIPPED | OUTPATIENT
Start: 2019-11-08

## 2019-11-08 NOTE — TELEPHONE ENCOUNTER
Pt would like a refill of atorvastatin (LIPITOR) 40 mg tablet for 90 day supply  Please send to pharmacy on file

## 2019-11-21 DIAGNOSIS — I10 ESSENTIAL HYPERTENSION: ICD-10-CM

## 2019-11-21 RX ORDER — ATENOLOL 50 MG/1
50 TABLET ORAL DAILY
Qty: 90 TABLET | Refills: 3 | Status: SHIPPED | OUTPATIENT
Start: 2019-11-21

## 2019-12-05 DIAGNOSIS — I10 ESSENTIAL HYPERTENSION: ICD-10-CM

## 2019-12-05 RX ORDER — QUINAPRIL 10 MG/1
TABLET ORAL
Qty: 30 TABLET | Refills: 0 | OUTPATIENT
Start: 2019-12-05

## 2019-12-05 RX ORDER — QUINAPRIL 5 1/1
5 TABLET ORAL DAILY
Qty: 90 TABLET | Refills: 2 | Status: SHIPPED | OUTPATIENT
Start: 2019-12-05 | End: 2020-03-13 | Stop reason: SDUPTHER

## 2020-03-13 ENCOUNTER — TELEPHONE (OUTPATIENT)
Dept: CARDIOLOGY CLINIC | Facility: CLINIC | Age: 80
End: 2020-03-13

## 2020-03-13 DIAGNOSIS — I10 ESSENTIAL HYPERTENSION: ICD-10-CM

## 2020-03-13 RX ORDER — QUINAPRIL 5 1/1
5 TABLET ORAL DAILY
Qty: 90 TABLET | Refills: 2 | Status: SHIPPED | OUTPATIENT
Start: 2020-03-13

## 2020-04-23 ENCOUNTER — HOSPITAL ENCOUNTER (OUTPATIENT)
Facility: HOSPITAL | Age: 80
Setting detail: OBSERVATION
Discharge: HOME | End: 2020-04-24
Attending: EMERGENCY MEDICINE | Admitting: EMERGENCY MEDICINE
Payer: MEDICARE

## 2020-04-23 ENCOUNTER — APPOINTMENT (EMERGENCY)
Dept: RADIOLOGY | Facility: HOSPITAL | Age: 80
End: 2020-04-23
Payer: MEDICARE

## 2020-04-23 ENCOUNTER — APPOINTMENT (OUTPATIENT)
Dept: CT IMAGING | Facility: HOSPITAL | Age: 80
End: 2020-04-23
Payer: MEDICARE

## 2020-04-23 DIAGNOSIS — R93.89 ABNORMAL CT OF THE CHEST: ICD-10-CM

## 2020-04-23 DIAGNOSIS — R07.9 CHEST PAIN: Primary | ICD-10-CM

## 2020-04-23 PROBLEM — I34.1 MITRAL VALVE PROLAPSE: Status: ACTIVE | Noted: 2020-04-23

## 2020-04-23 PROBLEM — Z85.3 HISTORY OF BREAST CANCER: Status: ACTIVE | Noted: 2020-04-23

## 2020-04-23 PROBLEM — J98.59 OTHER DISEASES OF MEDIASTINUM, NOT ELSEWHERE CLASSIFIED: Status: ACTIVE | Noted: 2020-04-23

## 2020-04-23 LAB
ALBUMIN SERPL BCP-MCNC: 3.3 G/DL (ref 3.5–5)
ALP SERPL-CCNC: 79 U/L (ref 46–116)
ALT SERPL W P-5'-P-CCNC: 21 U/L (ref 12–78)
ANION GAP SERPL CALCULATED.3IONS-SCNC: 8 MMOL/L (ref 4–13)
AST SERPL W P-5'-P-CCNC: 22 U/L (ref 5–45)
BASOPHILS # BLD AUTO: 0.04 THOUSANDS/ΜL (ref 0–0.1)
BASOPHILS NFR BLD AUTO: 0 % (ref 0–1)
BILIRUB SERPL-MCNC: 0.3 MG/DL (ref 0.2–1)
BUN SERPL-MCNC: 36 MG/DL (ref 5–25)
CALCIUM SERPL-MCNC: 8.7 MG/DL (ref 8.3–10.1)
CHLORIDE SERPL-SCNC: 104 MMOL/L (ref 100–108)
CO2 SERPL-SCNC: 29 MMOL/L (ref 21–32)
CREAT SERPL-MCNC: 1.17 MG/DL (ref 0.6–1.3)
EOSINOPHIL # BLD AUTO: 1.5 THOUSAND/ΜL (ref 0–0.61)
EOSINOPHIL NFR BLD AUTO: 16 % (ref 0–6)
ERYTHROCYTE [DISTWIDTH] IN BLOOD BY AUTOMATED COUNT: 13.2 % (ref 11.6–15.1)
GFR SERPL CREATININE-BSD FRML MDRD: 44 ML/MIN/1.73SQ M
GLUCOSE SERPL-MCNC: 94 MG/DL (ref 65–140)
HCT VFR BLD AUTO: 41.8 % (ref 34.8–46.1)
HGB BLD-MCNC: 13.2 G/DL (ref 11.5–15.4)
IMM GRANULOCYTES # BLD AUTO: 0.03 THOUSAND/UL (ref 0–0.2)
IMM GRANULOCYTES NFR BLD AUTO: 0 % (ref 0–2)
LYMPHOCYTES # BLD AUTO: 2.69 THOUSANDS/ΜL (ref 0.6–4.47)
LYMPHOCYTES NFR BLD AUTO: 29 % (ref 14–44)
MCH RBC QN AUTO: 33.2 PG (ref 26.8–34.3)
MCHC RBC AUTO-ENTMCNC: 31.6 G/DL (ref 31.4–37.4)
MCV RBC AUTO: 105 FL (ref 82–98)
MONOCYTES # BLD AUTO: 1.39 THOUSAND/ΜL (ref 0.17–1.22)
MONOCYTES NFR BLD AUTO: 15 % (ref 4–12)
NEUTROPHILS # BLD AUTO: 3.57 THOUSANDS/ΜL (ref 1.85–7.62)
NEUTS SEG NFR BLD AUTO: 40 % (ref 43–75)
NRBC BLD AUTO-RTO: 0 /100 WBCS
NT-PROBNP SERPL-MCNC: 1615 PG/ML
PLATELET # BLD AUTO: 215 THOUSANDS/UL (ref 149–390)
PMV BLD AUTO: 9.2 FL (ref 8.9–12.7)
POTASSIUM SERPL-SCNC: 4.4 MMOL/L (ref 3.5–5.3)
PROT SERPL-MCNC: 7.6 G/DL (ref 6.4–8.2)
RBC # BLD AUTO: 3.98 MILLION/UL (ref 3.81–5.12)
SODIUM SERPL-SCNC: 141 MMOL/L (ref 136–145)
TROPONIN I SERPL-MCNC: 0.02 NG/ML
TROPONIN I SERPL-MCNC: <0.02 NG/ML
TSH SERPL DL<=0.05 MIU/L-ACNC: 4.68 UIU/ML (ref 0.36–3.74)
WBC # BLD AUTO: 9.22 THOUSAND/UL (ref 4.31–10.16)

## 2020-04-23 PROCEDURE — 99285 EMERGENCY DEPT VISIT HI MDM: CPT | Performed by: EMERGENCY MEDICINE

## 2020-04-23 PROCEDURE — 84484 ASSAY OF TROPONIN QUANT: CPT | Performed by: PHYSICIAN ASSISTANT

## 2020-04-23 PROCEDURE — 71046 X-RAY EXAM CHEST 2 VIEWS: CPT

## 2020-04-23 PROCEDURE — 80053 COMPREHEN METABOLIC PANEL: CPT | Performed by: EMERGENCY MEDICINE

## 2020-04-23 PROCEDURE — 84484 ASSAY OF TROPONIN QUANT: CPT | Performed by: EMERGENCY MEDICINE

## 2020-04-23 PROCEDURE — 85025 COMPLETE CBC W/AUTO DIFF WBC: CPT | Performed by: EMERGENCY MEDICINE

## 2020-04-23 PROCEDURE — 93005 ELECTROCARDIOGRAM TRACING: CPT

## 2020-04-23 PROCEDURE — 99220 PR INITIAL OBSERVATION CARE/DAY 70 MINUTES: CPT | Performed by: PHYSICIAN ASSISTANT

## 2020-04-23 PROCEDURE — 99285 EMERGENCY DEPT VISIT HI MDM: CPT

## 2020-04-23 PROCEDURE — 71250 CT THORAX DX C-: CPT

## 2020-04-23 PROCEDURE — 84443 ASSAY THYROID STIM HORMONE: CPT | Performed by: PHYSICIAN ASSISTANT

## 2020-04-23 PROCEDURE — 36415 COLL VENOUS BLD VENIPUNCTURE: CPT | Performed by: EMERGENCY MEDICINE

## 2020-04-23 PROCEDURE — 83880 ASSAY OF NATRIURETIC PEPTIDE: CPT | Performed by: PHYSICIAN ASSISTANT

## 2020-04-23 RX ORDER — ATORVASTATIN CALCIUM 40 MG/1
40 TABLET, FILM COATED ORAL DAILY
Status: DISCONTINUED | OUTPATIENT
Start: 2020-04-24 | End: 2020-04-24 | Stop reason: HOSPADM

## 2020-04-23 RX ORDER — ASPIRIN 325 MG
325 TABLET ORAL DAILY
Status: DISCONTINUED | OUTPATIENT
Start: 2020-04-24 | End: 2020-04-24 | Stop reason: HOSPADM

## 2020-04-23 RX ORDER — DOCUSATE SODIUM 100 MG/1
100 CAPSULE, LIQUID FILLED ORAL 2 TIMES DAILY
Status: DISCONTINUED | OUTPATIENT
Start: 2020-04-23 | End: 2020-04-24 | Stop reason: HOSPADM

## 2020-04-23 RX ORDER — ACETAMINOPHEN 325 MG/1
650 TABLET ORAL EVERY 6 HOURS PRN
Status: DISCONTINUED | OUTPATIENT
Start: 2020-04-23 | End: 2020-04-24 | Stop reason: HOSPADM

## 2020-04-23 RX ORDER — ONDANSETRON 2 MG/ML
4 INJECTION INTRAMUSCULAR; INTRAVENOUS EVERY 6 HOURS PRN
Status: DISCONTINUED | OUTPATIENT
Start: 2020-04-23 | End: 2020-04-24 | Stop reason: HOSPADM

## 2020-04-23 RX ORDER — LISINOPRIL 5 MG/1
5 TABLET ORAL DAILY
Status: DISCONTINUED | OUTPATIENT
Start: 2020-04-24 | End: 2020-04-24 | Stop reason: HOSPADM

## 2020-04-24 ENCOUNTER — APPOINTMENT (OUTPATIENT)
Dept: NUCLEAR MEDICINE | Facility: HOSPITAL | Age: 80
End: 2020-04-24
Payer: MEDICARE

## 2020-04-24 ENCOUNTER — APPOINTMENT (OUTPATIENT)
Dept: NON INVASIVE DIAGNOSTICS | Facility: HOSPITAL | Age: 80
End: 2020-04-24
Payer: MEDICARE

## 2020-04-24 VITALS
WEIGHT: 131 LBS | HEART RATE: 74 BPM | HEIGHT: 65 IN | OXYGEN SATURATION: 94 % | BODY MASS INDEX: 21.83 KG/M2 | DIASTOLIC BLOOD PRESSURE: 67 MMHG | SYSTOLIC BLOOD PRESSURE: 120 MMHG | RESPIRATION RATE: 18 BRPM | TEMPERATURE: 98.5 F

## 2020-04-24 LAB
ANION GAP SERPL CALCULATED.3IONS-SCNC: 9 MMOL/L (ref 4–13)
ARRHY DURING EX: NORMAL
ATRIAL RATE: 66 BPM
ATRIAL RATE: 79 BPM
BUN SERPL-MCNC: 29 MG/DL (ref 5–25)
CALCIUM SERPL-MCNC: 8.7 MG/DL (ref 8.3–10.1)
CHEST PAIN STATEMENT: NORMAL
CHLORIDE SERPL-SCNC: 108 MMOL/L (ref 100–108)
CHOLEST SERPL-MCNC: 124 MG/DL (ref 50–200)
CO2 SERPL-SCNC: 26 MMOL/L (ref 21–32)
CREAT SERPL-MCNC: 0.9 MG/DL (ref 0.6–1.3)
GFR SERPL CREATININE-BSD FRML MDRD: 61 ML/MIN/1.73SQ M
GLUCOSE P FAST SERPL-MCNC: 87 MG/DL (ref 65–99)
GLUCOSE SERPL-MCNC: 87 MG/DL (ref 65–140)
HDLC SERPL-MCNC: 39 MG/DL
LDLC SERPL CALC-MCNC: 75 MG/DL (ref 0–100)
MAGNESIUM SERPL-MCNC: 2.2 MG/DL (ref 1.6–2.6)
MAX DIASTOLIC BP: 80 MMHG
MAX HEART RATE: 171 BPM
MAX PREDICTED HEART RATE: 140 BPM
MAX. SYSTOLIC BP: 132 MMHG
NONHDLC SERPL-MCNC: 85 MG/DL
P AXIS: 27 DEGREES
P AXIS: 28 DEGREES
PLATELET # BLD AUTO: 188 THOUSANDS/UL (ref 149–390)
PMV BLD AUTO: 9 FL (ref 8.9–12.7)
POTASSIUM SERPL-SCNC: 3.6 MMOL/L (ref 3.5–5.3)
PR INTERVAL: 144 MS
PR INTERVAL: 156 MS
PROTOCOL NAME: NORMAL
QRS AXIS: 60 DEGREES
QRS AXIS: 61 DEGREES
QRSD INTERVAL: 72 MS
QRSD INTERVAL: 76 MS
QT INTERVAL: 376 MS
QT INTERVAL: 422 MS
QTC INTERVAL: 431 MS
QTC INTERVAL: 442 MS
REASON FOR TERMINATION: NORMAL
SODIUM SERPL-SCNC: 143 MMOL/L (ref 136–145)
T WAVE AXIS: 38 DEGREES
T WAVE AXIS: 43 DEGREES
TARGET HR FORMULA: NORMAL
TEST INDICATION: NORMAL
TIME IN EXERCISE PHASE: NORMAL
TRIGL SERPL-MCNC: 50 MG/DL
TROPONIN I SERPL-MCNC: 0.03 NG/ML
VENTRICULAR RATE: 66 BPM
VENTRICULAR RATE: 79 BPM

## 2020-04-24 PROCEDURE — 78452 HT MUSCLE IMAGE SPECT MULT: CPT

## 2020-04-24 PROCEDURE — 93010 ELECTROCARDIOGRAM REPORT: CPT | Performed by: INTERNAL MEDICINE

## 2020-04-24 PROCEDURE — 80061 LIPID PANEL: CPT | Performed by: PHYSICIAN ASSISTANT

## 2020-04-24 PROCEDURE — 99215 OFFICE O/P EST HI 40 MIN: CPT | Performed by: INTERNAL MEDICINE

## 2020-04-24 PROCEDURE — 93306 TTE W/DOPPLER COMPLETE: CPT | Performed by: INTERNAL MEDICINE

## 2020-04-24 PROCEDURE — 83735 ASSAY OF MAGNESIUM: CPT | Performed by: PHYSICIAN ASSISTANT

## 2020-04-24 PROCEDURE — 93017 CV STRESS TEST TRACING ONLY: CPT

## 2020-04-24 PROCEDURE — 85049 AUTOMATED PLATELET COUNT: CPT | Performed by: PHYSICIAN ASSISTANT

## 2020-04-24 PROCEDURE — 78452 HT MUSCLE IMAGE SPECT MULT: CPT | Performed by: INTERNAL MEDICINE

## 2020-04-24 PROCEDURE — 93306 TTE W/DOPPLER COMPLETE: CPT

## 2020-04-24 PROCEDURE — 84484 ASSAY OF TROPONIN QUANT: CPT | Performed by: PHYSICIAN ASSISTANT

## 2020-04-24 PROCEDURE — 93016 CV STRESS TEST SUPVJ ONLY: CPT | Performed by: INTERNAL MEDICINE

## 2020-04-24 PROCEDURE — 80048 BASIC METABOLIC PNL TOTAL CA: CPT | Performed by: PHYSICIAN ASSISTANT

## 2020-04-24 PROCEDURE — 93005 ELECTROCARDIOGRAM TRACING: CPT

## 2020-04-24 PROCEDURE — 93018 CV STRESS TEST I&R ONLY: CPT | Performed by: INTERNAL MEDICINE

## 2020-04-24 PROCEDURE — A9502 TC99M TETROFOSMIN: HCPCS

## 2020-04-24 PROCEDURE — 99217 PR OBSERVATION CARE DISCHARGE MANAGEMENT: CPT | Performed by: INTERNAL MEDICINE

## 2020-04-24 RX ADMIN — LISINOPRIL 5 MG: 5 TABLET ORAL at 08:23

## 2020-04-24 RX ADMIN — ATORVASTATIN CALCIUM 40 MG: 40 TABLET, FILM COATED ORAL at 08:23

## 2020-04-24 RX ADMIN — DOCUSATE SODIUM 100 MG: 100 CAPSULE, LIQUID FILLED ORAL at 08:19

## 2020-04-24 RX ADMIN — ENOXAPARIN SODIUM 40 MG: 40 INJECTION SUBCUTANEOUS at 08:18

## 2020-04-24 RX ADMIN — REGADENOSON 0.4 MG: 0.08 INJECTION, SOLUTION INTRAVENOUS at 10:35

## 2020-04-24 RX ADMIN — ASPIRIN 325 MG ORAL TABLET 325 MG: 325 PILL ORAL at 08:22

## 2024-06-07 ENCOUNTER — TELEPHONE (OUTPATIENT)
Age: 84
End: 2024-06-07